# Patient Record
(demographics unavailable — no encounter records)

---

## 2024-10-30 NOTE — PAST MEDICAL HISTORY
[FreeTextEntry1] : Pt was on Zoloft 75mg since 3rd grade. Dose was increased about 1 year ago. She sees Dr Wu for therapy PRN now.

## 2024-10-30 NOTE — PLAN
[FreeTextEntry5] : counseling and support provided -continue zoloft 125mg po daily for anxiety. -continue vistaril 25-50 mg po bid prn for acute panic and insomnia. Risks and benefits discussed with pt and mom. -restart IT with Dr Wu, therapist - restarted -er/911 prn -f/u in 12 weeks.

## 2024-10-30 NOTE — REASON FOR VISIT
[Telehealth (audio & video) - Individual/Group] : This visit was provided via telehealth using real-time 2-way audio visual technology. [Medical Office: ([facility])___] : The provider was located at the medical office in [unfilled]. [Home] : The patient, [unfilled], was located at home, [unfilled], at the time of the visit. [Participant(s) identity verified] : Participant(s) identity verified. [Verbal consent obtained from patient/other participant(s)] : Verbal consent for telehealth/telephonic services obtained from patient/other participant(s) [If not patient, verbal consent obtained from parent/guardian/caretaker (name, relationship) ___ with patient assenting] : Verbal consent for telehealth/telephonic services was obtained from parent/guardian/caretaker, [unfilled], with patient assenting. [Patient] : Patient [Mother] : mother [FreeTextEntry1] : anxiety

## 2024-10-30 NOTE — FAMILY HISTORY
[FreeTextEntry1] : mom - anxiety (pt has not been on meds for the last 20 years)\par  maternal aunt - anxiety \par  \par  No family history of suicide.

## 2024-10-30 NOTE — HISTORY OF PRESENT ILLNESS
[FreeTextEntry1] : Patient is a 11 year old girl, single, unemployed, domiciled with biological parents and 8 year old sister, currently in the 5th grade [employer] school in [place], regular classes, with no PMHx and PPHx of anxiety, no previous psychiatric hospitalization, no previous suicide attempts, currently not in treatment, was referred to clinic for med management as her previous MD was retiring. Pt was seen by Dr Calero (retiring) and Dr trevizo (therapy)  Since last visit, pts dose of zoloft was continued at 125mg. She states she is doing well. She loves school, she feels better being in her routine. Her peds md is lowering her omeprozole. She is nervous about that.    She is compliant with meds. No side effects to meds. No SI/HI.  Risk Assessment: Low risk for suicide/ aggression both acutely and chronically. RISK Factors: anxiety PROTECTIVE Factors: no previous attempt, no access to lethal means/ no access to firearm, no substance abuse, no legal history, no history of aggression, does not present with vindictive intent, no SI/HI, no psychosis, positive therapeutic relationship, engaged in school, reality testing intact, good social support, future oriented, no previous suicide attempts or violent history

## 2025-01-14 NOTE — REASON FOR VISIT
[Telehealth (audio & video) - Individual/Group] : This visit was provided via telehealth using real-time 2-way audio visual technology. [Medical Office: ([facility])___] : The provider was located at the medical office in [unfilled]. [Home] : The patient, [unfilled], was located at home, [unfilled], at the time of the visit. [Participant(s) identity verified] : Participant(s) identity verified. [Verbal consent obtained from patient/other participant(s)] : Verbal consent for telehealth/telephonic services obtained from patient/other participant(s) [Patient] : Patient [FreeTextEntry1] : depression/anxiety

## 2025-01-14 NOTE — PLAN
[FreeTextEntry5] : -counseling and support provided -consider increasing zoloft to 150 from 125mg po daily for worsening depression and  anxiety after collateral is obtained from pts mother - message left on her voicemail.  -continue vistaril 25-50 mg po bid prn for acute panic and insomnia. Risks and benefits discussed with pt and mom. -restart IT with Dr Wu, therapist -  -er/911 prn -f/u in 3-4 weeks.

## 2025-01-14 NOTE — PHYSICAL EXAM
[None] : none [Cooperative] : cooperative [Euthymic] : euthymic [Full] : full [Clear] : clear [Linear/Goal Directed] : linear/goal directed [Average] : average [WNL] : within normal limits [FreeTextEntry8] : "im feeling sad" negative - no fever

## 2025-01-14 NOTE — HISTORY OF PRESENT ILLNESS
[FreeTextEntry1] : Patient is a 11 year old girl, single, unemployed, domiciled with biological parents and 8 year old sister, currently in the 5th grade [employer] school in [place], regular classes, with no PMHx and PPHx of anxiety, no previous psychiatric hospitalization, no previous suicide attempts, currently not in treatment, was referred to clinic for med management as her previous MD was retiring. Pt was seen by Dr Calero (retiring) and Dr trevizo (therapy)  Since last visit, pts dose of zoloft was continued at 125mg. She states she has been having a hard time. She has been feeling depressed, even during break. She states she is a little overwhelmed with school work as its finals time and she has a panic attack during am exam yesterday.  She states socially things are hard. She is not making friends as expected even in a new school.   She is compliant with meds. No side effects to meds. No SI/HI.    Risk Assessment: Low risk for suicide/ aggression both acutely and chronically. RISK Factors: anxiety PROTECTIVE Factors: no previous attempt, no access to lethal means/ no access to firearm, no substance abuse, no legal history, no history of aggression, does not present with vindictive intent, no SI/HI, no psychosis, positive therapeutic relationship, engaged in school, reality testing intact, good social support, future oriented, no previous suicide attempts or violent history

## 2025-01-14 NOTE — REASON FOR VISIT
[Telehealth (audio & video) - Individual/Group] : This visit was provided via telehealth using real-time 2-way audio visual technology. [Medical Office: ([facility])___] : The provider was located at the medical office in [unfilled]. [Home] : The patient, [unfilled], was located at home, [unfilled], at the time of the visit. [Participant(s) identity verified] : Participant(s) identity verified. [Verbal consent obtained from patient/other participant(s)] : Verbal consent for telehealth/telephonic services obtained from patient/other participant(s) [Patient] : Patient [FreeTextEntry1] : depression/anxiety

## 2025-01-14 NOTE — SOCIAL HISTORY
[With Family] : lives with family [Unemployed] : unemployed [Never ] : never  [None] : none [FreeTextEntry1] : Pt was born and raised in [place]. School has always been [employer]. Pt states her childhood was amazing. Pt reports bullying in 2nd grade. She needed to change classes. \par  \par  Mom states pregnancy was uneventful. Full term/ Baby was breach so she was section. Baby had cord around her neck. Baby went to nursery. Mommy and baby went home together. Mom states she was very easy baby. Never slept well, never a napped. Milestones were on time except for walking 18 months. 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 No EI/IEP. in 3rd grade, they got her evaluated and she did not qualify. \par  \par  Mom states she had an bathroom accident at the end of 1st grade and since then, she was more anxious.

## 2025-01-14 NOTE — SOCIAL HISTORY
[With Family] : lives with family [Unemployed] : unemployed [Never ] : never  [None] : none [FreeTextEntry1] : Pt was born and raised in [place]. School has always been [employer]. Pt states her childhood was amazing. Pt reports bullying in 2nd grade. She needed to change classes. \par  \par  Mom states pregnancy was uneventful. Full term/ Baby was breach so she was section. Baby had cord around her neck. Baby went to nursery. Mommy and baby went home together. Mom states she was very easy baby. Never slept well, never a napped. Milestones were on time except for walking 18 months. 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 No EI/IEP. in 3rd grade, they got her evaluated and she did not qualify. \par  \par  Mom states she had an bathroom accident at the end of 1st grade and since then, she was more anxious.

## 2025-01-14 NOTE — PAST MEDICAL HISTORY
[FreeTextEntry1] : Pt was on Zoloft 75mg since 3rd grade. Dose was increased about 1 year ago. She sees Dr uW for therapy PRN now.

## 2025-01-14 NOTE — HISTORY OF PRESENT ILLNESS
[FreeTextEntry1] : Patient is a 11 year old girl, single, unemployed, domiciled with biological parents and 8 year old sister, currently in the 5th grade [employer] school in [place], regular classes, with no PMHx and PPHx of anxiety, no previous psychiatric hospitalization, no previous suicide attempts, currently not in treatment, was referred to clinic for med management as her previous MD was retiring. Pt was seen by Dr Calero (retiring) and Dr trevizo (therapy)  Since last visit, pts dose of zoloft was continued at 125mg. She states she has been having a hard time. She has been feeling depressed, even during break. She states she is a little overwhelmed with school work as its finals time and she has a panic attack during am exam yesterday.  She states socially things are hard. She is not making friends as expected even in a new school.   She is compliant with meds. No side effects to meds. No SI/HI.    Risk Assessment: Low risk for suicide/ aggression both acutely and chronically. RISK Factors: anxiety PROTECTIVE Factors: no previous attempt, no access to lethal means/ no access to firearm, no substance abuse, no legal history, no history of aggression, does not present with vindictive intent, no SI/HI, no psychosis, positive therapeutic relationship, engaged in school, reality testing intact, good social support, future oriented, no previous suicide attempts or violent history

## 2025-01-14 NOTE — FAMILY HISTORY
[FreeTextEntry1] : mom - anxiety (pt has not been on meds for the last 20 years)\par  maternal aunt - anxiety \par  \par  No family history of suicide. 
[FreeTextEntry1] : mom - anxiety (pt has not been on meds for the last 20 years)\par  maternal aunt - anxiety \par  \par  No family history of suicide. 
Skin normal color for race, warm, dry and intact. No evidence of rash.

## 2025-01-14 NOTE — PHYSICAL EXAM
[None] : none [Cooperative] : cooperative [Euthymic] : euthymic [Full] : full [Clear] : clear [Linear/Goal Directed] : linear/goal directed [Average] : average [WNL] : within normal limits [FreeTextEntry8] : "im feeling sad"

## 2025-01-15 NOTE — DISCUSSION/SUMMARY
[FreeTextEntry1] : mom contacted, she states pt puts a lot of pressure on herself, academically. Also socially pt feels like she is not making the true connection she intended to make with high school. Mom is ok with increasing the meds to zoloft 150mg for her symptoms at this time.

## 2025-03-03 NOTE — DISCUSSION/SUMMARY
[FreeTextEntry1] : allergies vs URI Symptomatic therapy indicated at this time. Cool mist humidifier PRN. Practical allergen avoidance discussed. Shower after playing outdoors. Keep bedroom windows closed. Take OTC allergy medication as discussed. Increase fluid intake. If no better in 1 week, call or return to the office.  Recommend symptomatic therapy as needed including acetaminophen or ibuprofen for fever/pain. Increase fluid intake. Avoid airway irritants. Discussed use/ avoidance of cold symptom medication. Cool mist humidifier at nighttime. For congestion- vicks vapor rub, saline sprays/ steamed showers with bulb suction. If patient is of age use the Nedipot as tolerated PRN. Advised to call the office if symptoms do not improve in 3-5 days or sooner for change/concerns/wheezes/distress. Call with any new or worsening symptoms or concerns.

## 2025-03-03 NOTE — REVIEW OF SYSTEMS
[Fever] : no fever [Itchy Eyes] : itchy eyes [Nasal Discharge] : nasal discharge [Nasal Congestion] : nasal congestion [Cough] : cough [Negative] : Genitourinary

## 2025-03-03 NOTE — HISTORY OF PRESENT ILLNESS
[de-identified] : ITCHY EYES, STUFFY NOSE TODAY [FreeTextEntry6] : +runny nose, congestion and cough x3 days no fevers mother at home with URI good PO intake, UOP and BMs

## 2025-03-24 NOTE — PHYSICAL EXAM
[NL] : moves all extremities x4, warm, well perfused x4 [de-identified] : erythematous papules to L elbow

## 2025-03-24 NOTE — DISCUSSION/SUMMARY
[FreeTextEntry1] : ENT referral for chronic congestion/ allergies as requested apply hydrocortisone to area 2x per day for 1 week vaseline / aquaphor as needed  recheck PRN

## 2025-03-24 NOTE — HISTORY OF PRESENT ILLNESS
[de-identified] : rash on left arm , body feels itchy [FreeTextEntry6] : rash noted to left arm while in school- itchiness +congestion  good PO intake, UOP and BMs no fevers

## 2025-04-15 NOTE — PLAN
[FreeTextEntry5] : -counseling and support provided -continue zoloft 150 mg - pt doing well on this dose  -continue vistaril 25-50 mg po bid prn for acute panic and insomnia. Risks and benefits discussed with pt and mom. -restart IT with Dr Wu, therapist - -er/911 prn -f/u in12 weeks.

## 2025-04-15 NOTE — REASON FOR VISIT
[Telehealth (audio & video) - Individual/Group] : This visit was provided via telehealth using real-time 2-way audio visual technology. [Medical Office: ([facility])___] : The provider was located at the medical office in [unfilled]. [Home] : The patient, [unfilled], was located at home, [unfilled], at the time of the visit. [Participant(s) identity verified] : Participant(s) identity verified. [Verbal consent obtained from patient/other participant(s)] : Verbal consent for telehealth/telephonic services obtained from patient/other participant(s) [If not patient, verbal consent obtained from parent/guardian/caretaker (name, relationship) ___ with patient assenting] : Verbal consent for telehealth/telephonic services was obtained from parent/guardian/caretaker, [unfilled], with patient assenting. [Patient] : Patient [Father] : father [FreeTextEntry1] : anxiety

## 2025-04-15 NOTE — SOCIAL HISTORY
[With Family] : lives with family [Unemployed] : unemployed [Never ] : never  [None] : none [FreeTextEntry1] : Pt was born and raised in [place]. School has always been [employer]. Pt states her childhood was amazing. Pt reports bullying in 2nd grade. She needed to change classes. \par  \par  Mom states pregnancy was uneventful. Full term/ Baby was breach so she was section. Baby had cord around her neck. Baby went to nursery. Mommy and baby went home together. Mom states she was very easy baby. Never slept well, never a napped. Milestones were on time except for walking 18 months. 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 No EI/IEP. in 3rd grade, they got her evaluated and she did not qualify. \par  \par  Mom states she had an bathroom accident at the end of 1st grade and since then, she was more anxious.

## 2025-04-15 NOTE — HISTORY OF PRESENT ILLNESS
[FreeTextEntry1] : Patient is a 11 year old girl, single, unemployed, domiciled with biological parents and 8 year old sister, currently in the 5th grade [employer] school in [place], regular classes, with no PMHx and PPHx of anxiety, no previous psychiatric hospitalization, no previous suicide attempts, currently not in treatment, was referred to clinic for med management as her previous MD was retiring. Pt was seen by Dr Calero (retiring) and Dr trevizo (therapy)  Since last visit, pts dose of zoloft was increased to 150mg. She states she feels better on higher dose of meds, less anxious, less panic, positive mood., She is adjusting well to school. No side effects to meds.  No side effects to meds. No SI/HI. Dad present who confirms shes doing great   Risk Assessment: Low risk for suicide/ aggression both acutely and chronically. RISK Factors: anxiety PROTECTIVE Factors: no previous attempt, no access to lethal means/ no access to firearm, no substance abuse, no legal history, no history of aggression, does not present with vindictive intent, no SI/HI, no psychosis, positive therapeutic relationship, engaged in school, reality testing intact, good social support, future oriented, no previous suicide attempts or violent history

## 2025-05-14 NOTE — PHYSICAL EXAM
[EOMI] : grossly EOMI [Allergic Shiners] : allergic shiners [Pale Nasal Mucosa] : pale nasal mucosa [Hypertrophied Nasal Mucosa] : hypertrophied nasal mucosa [NL] : warm, clear [FreeTextEntry5] : ANGELO

## 2025-07-16 NOTE — SOCIAL HISTORY
[With Family] : lives with family [Unemployed] : unemployed [Never ] : never  [None] : none [FreeTextEntry1] : Pt was born and raised in [place]. School has always been [employer]. Pt states her childhood was amazing. Pt reports bullying in 2nd grade. She needed to change classes. \par  \par  Mom states pregnancy was uneventful. Full term/ Baby was breach so she was section. Baby had cord around her neck. Baby went to nursery. Mommy and baby went home together. Mom states she was very easy baby. Never slept well, never a napped. Milestones were on time except for walking 18 months. 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 No EI/IEP. in 3rd grade, they got her evaluated and she did not qualify. \par  \par  Mom states she had an bathroom accident at the end of 1st grade and since then, she was more anxious.

## 2025-07-16 NOTE — REASON FOR VISIT
[Telehealth (audio & video) - Individual/Group] : This visit was provided via telehealth using real-time 2-way audio visual technology. [Medical Office: ([facility])___] : The provider was located at the medical office in [unfilled]. [Home] : The patient, [unfilled], was located at home, [unfilled], at the time of the visit. [Participant(s) identity verified] : Participant(s) identity verified. [Verbal consent obtained from patient/other participant(s)] : Verbal consent for telehealth/telephonic services obtained from patient/other participant(s) [If not patient, verbal consent obtained from parent/guardian/caretaker (name, relationship) ___ with patient assenting] : Verbal consent for telehealth/telephonic services was obtained from parent/guardian/caretaker, [unfilled], with patient assenting. [Patient] : Patient [FreeTextEntry1] : anxiety

## 2025-07-16 NOTE — HISTORY OF PRESENT ILLNESS
[FreeTextEntry1] : Patient is a 11 year old girl, single, unemployed, domiciled with biological parents and 8 year old sister, currently in the 5th grade [employer] school in [place], regular classes, with no PMHx and PPHx of anxiety, no previous psychiatric hospitalization, no previous suicide attempts, currently not in treatment, was referred to clinic for med management as her previous MD was retiring. Pt was seen by Dr Calero (retiring) and Dr trevizo (therapy)  Since last visit, pts dose of zoloft was continued at 150mg. She states she feels really good. She ended school on high note, now she is going to theater camp and also working as a  at a local restaurant. She states the medicine helps.    No side effects to meds. No SI/HI. Mom  present who confirms shes doing great   Risk Assessment: Low risk for suicide/ aggression both acutely and chronically. RISK Factors: anxiety PROTECTIVE Factors: no previous attempt, no access to lethal means/ no access to firearm, no substance abuse, no legal history, no history of aggression, does not present with vindictive intent, no SI/HI, no psychosis, positive therapeutic relationship, engaged in school, reality testing intact, good social support, future oriented, no previous suicide attempts or violent history